# Patient Record
Sex: MALE | Race: WHITE | NOT HISPANIC OR LATINO | Employment: FULL TIME | URBAN - NONMETROPOLITAN AREA
[De-identification: names, ages, dates, MRNs, and addresses within clinical notes are randomized per-mention and may not be internally consistent; named-entity substitution may affect disease eponyms.]

---

## 2023-03-14 DIAGNOSIS — M25.561 PAIN IN BOTH KNEES, UNSPECIFIED CHRONICITY: Primary | ICD-10-CM

## 2023-03-14 DIAGNOSIS — M25.562 PAIN IN BOTH KNEES, UNSPECIFIED CHRONICITY: Primary | ICD-10-CM

## 2023-03-27 ENCOUNTER — OFFICE VISIT (OUTPATIENT)
Dept: ORTHOPEDIC SURGERY | Facility: CLINIC | Age: 58
End: 2023-03-27
Payer: COMMERCIAL

## 2023-03-27 ENCOUNTER — HOSPITAL ENCOUNTER (OUTPATIENT)
Dept: GENERAL RADIOLOGY | Facility: HOSPITAL | Age: 58
Discharge: HOME | End: 2023-03-27
Admitting: ORTHOPAEDIC SURGERY
Payer: COMMERCIAL

## 2023-03-27 VITALS
DIASTOLIC BLOOD PRESSURE: 86 MMHG | BODY MASS INDEX: 37.63 KG/M2 | HEIGHT: 72 IN | HEART RATE: 63 BPM | WEIGHT: 277.8 LBS | SYSTOLIC BLOOD PRESSURE: 130 MMHG

## 2023-03-27 DIAGNOSIS — M25.562 PAIN IN BOTH KNEES, UNSPECIFIED CHRONICITY: ICD-10-CM

## 2023-03-27 DIAGNOSIS — M17.0 PRIMARY OSTEOARTHRITIS OF KNEES, BILATERAL: Primary | ICD-10-CM

## 2023-03-27 DIAGNOSIS — M25.561 PAIN IN BOTH KNEES, UNSPECIFIED CHRONICITY: ICD-10-CM

## 2023-03-27 PROCEDURE — 20610 DRAIN/INJ JOINT/BURSA W/O US: CPT | Performed by: ORTHOPAEDIC SURGERY

## 2023-03-27 PROCEDURE — 99203 OFFICE O/P NEW LOW 30 MIN: CPT | Performed by: ORTHOPAEDIC SURGERY

## 2023-03-27 PROCEDURE — 73562 X-RAY EXAM OF KNEE 3: CPT

## 2023-03-27 PROCEDURE — 73562 X-RAY EXAM OF KNEE 3: CPT | Performed by: RADIOLOGY

## 2023-03-27 RX ORDER — FUROSEMIDE 40 MG/1
TABLET ORAL
COMMUNITY
Start: 2023-01-23

## 2023-03-27 RX ORDER — ATORVASTATIN CALCIUM 80 MG/1
TABLET, FILM COATED ORAL
COMMUNITY

## 2023-03-27 RX ORDER — FENOFIBRATE 145 MG/1
TABLET, COATED ORAL
COMMUNITY

## 2023-03-27 RX ORDER — PAROXETINE HYDROCHLORIDE 20 MG/1
TABLET, FILM COATED ORAL
COMMUNITY

## 2023-03-27 RX ORDER — POTASSIUM CHLORIDE 750 MG/1
TABLET, FILM COATED, EXTENDED RELEASE ORAL
COMMUNITY

## 2023-03-27 RX ORDER — LISINOPRIL 20 MG/1
TABLET ORAL EVERY 12 HOURS SCHEDULED
COMMUNITY

## 2023-03-27 RX ORDER — TESTOSTERONE CYPIONATE 200 MG/ML
INJECTION, SOLUTION INTRAMUSCULAR
COMMUNITY
Start: 2023-01-03

## 2023-03-27 RX ORDER — MULTIPLE VITAMINS W/ MINERALS TAB 9MG-400MCG
1 TAB ORAL DAILY
COMMUNITY

## 2023-03-27 RX ORDER — DICLOFENAC SODIUM 20 MG/G
SOLUTION TOPICAL
COMMUNITY

## 2023-03-27 RX ORDER — NITROGLYCERIN 0.4 MG/1
TABLET SUBLINGUAL
COMMUNITY

## 2023-03-27 RX ORDER — SILDENAFIL CITRATE 20 MG/1
TABLET ORAL
COMMUNITY
Start: 2022-12-12

## 2023-03-27 RX ADMIN — LIDOCAINE HYDROCHLORIDE 3 ML: 10 INJECTION, SOLUTION EPIDURAL; INFILTRATION; INTRACAUDAL; PERINEURAL at 11:03

## 2023-03-27 RX ADMIN — METHYLPREDNISOLONE ACETATE 40 MG: 40 INJECTION, SUSPENSION INTRA-ARTICULAR; INTRALESIONAL; INTRAMUSCULAR; SOFT TISSUE at 11:03

## 2023-03-27 NOTE — PROGRESS NOTES
New Patient Visit      Patient: Murali Lazcano  YOB: 1965  Date of Encounter: 03/27/2023        Chief Complaint:   Chief Complaint   Patient presents with   • Left Knee - Initial Evaluation, Pain   • Right Knee - Pain, Initial Evaluation           HPI:   Murali Lazcano, 57 y.o. male, referred by Shay Chiu MD presents with bilateral knee pain now over the past 2 decades he has had arthroscopy to his knees has continue working.  He reports knee pain is worsening fascially left.  He denies giving way or locking of either knee he has no recent injuries.  He reports no weakness or numbness to either leg.  His medical history includes peripheral vascular disease sleep apnea and coronary artery disease.  Walking history is positive he does not smoke.        Active Problem List:  Patient Active Problem List   Diagnosis   • Primary osteoarthritis of knees, bilateral           Past Medical History:  Past Medical History:   Diagnosis Date   • CAD (coronary artery disease)    • Hypertension    • PVD (peripheral vascular disease)    • Sleep apnea            Past Surgical History:  Past Surgical History:   Procedure Laterality Date   • KNEE SURGERY             Family History:  Family History   Problem Relation Age of Onset   • Heart disease Father          Social History:  Social History     Socioeconomic History   • Marital status:    Tobacco Use   • Smoking status: Former     Types: Cigarettes   • Smokeless tobacco: Never   Vaping Use   • Vaping Use: Never used   Substance and Sexual Activity   • Alcohol use: Yes   • Drug use: Never   • Sexual activity: Defer     Body mass index is 37.68 kg/m².      Medications:  Current Outpatient Medications   Medication Sig Dispense Refill   • atorvastatin (LIPITOR) 80 MG tablet atorvastatin 80 mg tablet     • Diclofenac Sodium (Pennsaid) 2 % solution Apply  topically.     • fenofibrate (TRICOR) 145 MG tablet fenofibrate nanocrystallized 145 mg tablet     •  furosemide (LASIX) 40 MG tablet      • lisinopril (PRINIVIL,ZESTRIL) 20 MG tablet Every 12 (Twelve) Hours.     • multivitamin with minerals tablet tablet Take 1 tablet by mouth Daily.     • nitroglycerin (NITROSTAT) 0.4 MG SL tablet Nitrostat 0.4 mg sublingual tablet   Use 1 tab SL q 5 min x 3 PRN for chest pain. MAX DOSE 3 tabs n 15 min.     • PARoxetine (PAXIL) 20 MG tablet paroxetine 20 mg tablet     • potassium chloride 10 MEQ CR tablet potassium chloride ER 10 mEq tablet,extended release     • sildenafil (REVATIO) 20 MG tablet take 2.5 tablet by oral route  every day for give him #30 please     • Testosterone Cypionate (DEPOTESTOTERONE CYPIONATE) 200 MG/ML injection        No current facility-administered medications for this visit.         Allergies:  No Known Allergies      Review of Systems:   Review of Systems   Constitutional: Negative.  Negative for chills, fatigue and fever.   HENT: Negative.  Negative for congestion, facial swelling, mouth sores, sore throat, trouble swallowing and voice change.    Eyes: Negative.  Negative for pain, discharge and visual disturbance.   Respiratory: Positive for apnea. Negative for cough, chest tightness, shortness of breath and wheezing.    Cardiovascular: Negative.  Negative for chest pain, palpitations and leg swelling.   Gastrointestinal: Negative.  Negative for abdominal distention, abdominal pain, anal bleeding, constipation, diarrhea, nausea and vomiting.   Endocrine: Negative.  Negative for cold intolerance, heat intolerance, polyphagia and polyuria.   Genitourinary: Negative.  Negative for difficulty urinating, dysuria, flank pain and hematuria.   Musculoskeletal: Positive for arthralgias and joint swelling.   Skin: Negative.  Negative for color change, pallor, rash and wound.   Allergic/Immunologic: Negative.  Negative for environmental allergies, food allergies and immunocompromised state.   Neurological: Negative.  Negative for dizziness, tremors, seizures,  "syncope, facial asymmetry, speech difficulty, weakness, light-headedness, numbness and headaches.   Hematological: Negative.  Negative for adenopathy. Does not bruise/bleed easily.   Psychiatric/Behavioral: Negative.  Negative for behavioral problems, confusion, dysphoric mood, self-injury, sleep disturbance and suicidal ideas. The patient is not nervous/anxious.          Physical Exam:   Physical Exam  GENERAL: 57 y.o. male, alert and oriented X 3 in no acute distress.   Visit Vitals  /86   Pulse 63   Ht 182.9 cm (72\")   Wt 126 kg (277 lb 12.8 oz)   BMI 37.68 kg/m²       GENERAL APPEARANCE: Awake, alert & oriented, in no acute distress and well developed, well nourished.   PSYCH: Normal mood and affect  LUNGS: Breathing nonlabored, no wheezing  EYES: Sclera anicteric, pupils equal  CARDIOVASCULAR: Palpable pulses. Capillary refill less than 2 seconds  INTEGUMENTARY: Skin intact, co clubbing, cyanosis  NEUROLOGIC: Normal Sensation  MUSCULOSKELETAL:  Orthopedic Examination:   Left knee demonstrates moderate effusion moderate medial joint line tenderness mobility is full mild varus alignment no gross deformity with normal tracking neurovascular exam grossly intact.    Right knee evaluation reveals minimal effusion moderate posterior medial joint line tenderness no gross instability motion is full normal neurovascular status.      Radiology/Labs:     XR Knee 3 View Bilateral    Result Date: 3/27/2023    Advanced right medial compartment joint space narrowing. Moderate joint space narrowing of the left medial compartment.  This report was finalized on 3/27/2023 8:54 AM by Dr. Milton Ware MD.        Radiographs right knee weightbearing demonstrate complete loss of medial joint space mild varus alignment squaring lateral femoral condyle tibial plateau.  Left knee radiographs weightbearing show 50% narrowing of medial joint space and mild squaring of the medial tibial plateau otherwise negative.      Assessment & " Plan:   57 y.o. male presents with bilateral knee complaints over the years he has worked in the past as an  and has been disabled.  Presents with bilateral knee pain and moderate effusion to the left with radiographs identifying me primarily medial compartment OA.  We discussed options he was then treated with intra-articular steroid injection Depo-Medrol 40 mg lidocaine block evacuating 23 cc of serous fluid left knee.  He will return in the future as needed depending on his response.        ICD-10-CM ICD-9-CM   1. Primary osteoarthritis of knees, bilateral  M17.0 715.16         Large Joint Arthrocentesis: R knee  Date/Time: 3/27/2023 11:03 AM  Consent given by: patient  Site marked: site marked  Timeout: Immediately prior to procedure a time out was called to verify the correct patient, procedure, equipment, support staff and site/side marked as required   Supporting Documentation  Indications: pain   Procedure Details  Location: knee - R knee  Preparation: Patient was prepped and draped in the usual sterile fashion  Needle size: 25 G  Approach: anterolateral  Medications administered: 40 mg methylPREDNISolone acetate 40 MG/ML; 3 mL lidocaine PF 1% 1 %  Patient tolerance: patient tolerated the procedure well with no immediate complications    Large Joint Arthrocentesis: L knee  Date/Time: 3/27/2023 11:03 AM  Consent given by: patient  Site marked: site marked  Timeout: Immediately prior to procedure a time out was called to verify the correct patient, procedure, equipment, support staff and site/side marked as required   Supporting Documentation  Indications: pain and joint swelling   Procedure Details  Location: knee - L knee  Preparation: Patient was prepped and draped in the usual sterile fashion  Needle size: 18 G  Approach: anterolateral  Medications administered: 40 mg methylPREDNISolone acetate 40 MG/ML; 3 mL lidocaine PF 1% 1 %  Aspirate amount: 23 mL  Aspirate: serous  Patient tolerance:  patient tolerated the procedure well with no immediate complications            Cc:   Sahy Chiu MD                This document has been electronically signed by Loyd Felix MD   April 3, 2023 11:02 EDT

## 2023-04-03 PROBLEM — M17.0 PRIMARY OSTEOARTHRITIS OF KNEES, BILATERAL: Status: ACTIVE | Noted: 2023-04-03

## 2023-04-03 RX ORDER — LIDOCAINE HYDROCHLORIDE 10 MG/ML
3 INJECTION, SOLUTION EPIDURAL; INFILTRATION; INTRACAUDAL; PERINEURAL
Status: COMPLETED | OUTPATIENT
Start: 2023-03-27 | End: 2023-03-27

## 2023-04-03 RX ORDER — METHYLPREDNISOLONE ACETATE 40 MG/ML
40 INJECTION, SUSPENSION INTRA-ARTICULAR; INTRALESIONAL; INTRAMUSCULAR; SOFT TISSUE
Status: COMPLETED | OUTPATIENT
Start: 2023-03-27 | End: 2023-03-27